# Patient Record
Sex: FEMALE | URBAN - METROPOLITAN AREA
[De-identification: names, ages, dates, MRNs, and addresses within clinical notes are randomized per-mention and may not be internally consistent; named-entity substitution may affect disease eponyms.]

---

## 2024-03-09 ENCOUNTER — ANESTHESIA (OUTPATIENT)
Dept: LABOR AND DELIVERY | Age: 34
End: 2024-03-09

## 2024-03-09 ENCOUNTER — ANESTHESIA EVENT (OUTPATIENT)
Dept: LABOR AND DELIVERY | Age: 34
End: 2024-03-09

## 2024-03-09 ENCOUNTER — HOSPITAL ENCOUNTER (INPATIENT)
Age: 34
LOS: 1 days | Discharge: HOME | DRG: 560 | End: 2024-03-10
Attending: OBSTETRICS & GYNECOLOGY | Admitting: OBSTETRICS & GYNECOLOGY
Payer: COMMERCIAL

## 2024-03-09 PROBLEM — O34.219 HISTORY OF CESAREAN DELIVERY, CURRENTLY PREGNANT: Status: ACTIVE | Noted: 2024-03-09

## 2024-03-09 PROBLEM — R10.9 ABDOMINAL PAIN IN PREGNANCY, THIRD TRIMESTER: Status: ACTIVE | Noted: 2024-03-09

## 2024-03-09 PROBLEM — Z3A.37 37 WEEKS GESTATION OF PREGNANCY: Status: ACTIVE | Noted: 2024-03-09

## 2024-03-09 PROBLEM — O26.893 ABDOMINAL PAIN IN PREGNANCY, THIRD TRIMESTER: Status: ACTIVE | Noted: 2024-03-09

## 2024-03-09 LAB
ABO + RH BLD: NORMAL
BASE DEFICIT BLD-SCNC: 4 MMOL/L
BASE DEFICIT BLD-SCNC: 5.8 MMOL/L
BLOOD GROUP ANTIBODIES SERPL: NORMAL
ERYTHROCYTE [DISTWIDTH] IN BLOOD BY AUTOMATED COUNT: 17 % (ref 11.9–14.6)
HCO3 BLD-SCNC: 20.9 MMOL/L (ref 22–26)
HCO3 BLDV-SCNC: 22.4 MMOL/L (ref 23–28)
HCT VFR BLD AUTO: 34.9 % (ref 35.8–46.3)
HGB BLD-MCNC: 11.2 G/DL (ref 11.7–15.4)
MCH RBC QN AUTO: 28.1 PG (ref 26.1–32.9)
MCHC RBC AUTO-ENTMCNC: 32.1 G/DL (ref 31.4–35)
MCV RBC AUTO: 87.7 FL (ref 82–102)
NRBC # BLD: 0 K/UL (ref 0–0.2)
PCO2 BLDCO: 44 MMHG (ref 32–68)
PCO2 BLDCO: 45 MMHG (ref 32–68)
PH BLDCO: 7.28 (ref 7.15–7.38)
PH BLDCO: 7.31 (ref 7.15–7.38)
PLATELET # BLD AUTO: 144 K/UL (ref 150–450)
PMV BLD AUTO: 10.1 FL (ref 9.4–12.3)
PO2 BLDCO: 15 MMHG
PO2 BLDCO: 19 MMHG
RBC # BLD AUTO: 3.98 M/UL (ref 4.05–5.2)
RPR SER QL: NONREACTIVE
SAO2 % BLD: 16 % (ref 95–98)
SAO2 % BLDV: 25.6 % (ref 65–88)
SERVICE CMNT-IMP: ABNORMAL
SERVICE CMNT-IMP: ABNORMAL
SPECIMEN EXP DATE BLD: NORMAL
SPECIMEN TYPE: ABNORMAL
SPECIMEN TYPE: ABNORMAL
WBC # BLD AUTO: 8.6 K/UL (ref 4.3–11.1)

## 2024-03-09 PROCEDURE — 59025 FETAL NON-STRESS TEST: CPT

## 2024-03-09 PROCEDURE — 1100000000 HC RM PRIVATE

## 2024-03-09 PROCEDURE — 2580000003 HC RX 258: Performed by: OBSTETRICS & GYNECOLOGY

## 2024-03-09 PROCEDURE — 7100000011 HC PHASE II RECOVERY - ADDTL 15 MIN

## 2024-03-09 PROCEDURE — 82803 BLOOD GASES ANY COMBINATION: CPT

## 2024-03-09 PROCEDURE — 6360000002 HC RX W HCPCS: Performed by: ANESTHESIOLOGY

## 2024-03-09 PROCEDURE — 86900 BLOOD TYPING SEROLOGIC ABO: CPT

## 2024-03-09 PROCEDURE — 99285 EMERGENCY DEPT VISIT HI MDM: CPT

## 2024-03-09 PROCEDURE — 7220000101 HC DELIVERY VAGINAL/SINGLE

## 2024-03-09 PROCEDURE — 86850 RBC ANTIBODY SCREEN: CPT

## 2024-03-09 PROCEDURE — 6370000000 HC RX 637 (ALT 250 FOR IP): Performed by: OBSTETRICS & GYNECOLOGY

## 2024-03-09 PROCEDURE — 3700000025 EPIDURAL BLOCK: Performed by: ANESTHESIOLOGY

## 2024-03-09 PROCEDURE — 85027 COMPLETE CBC AUTOMATED: CPT

## 2024-03-09 PROCEDURE — 2500000003 HC RX 250 WO HCPCS: Performed by: ANESTHESIOLOGY

## 2024-03-09 PROCEDURE — 00HU33Z INSERTION OF INFUSION DEVICE INTO SPINAL CANAL, PERCUTANEOUS APPROACH: ICD-10-PCS | Performed by: OBSTETRICS & GYNECOLOGY

## 2024-03-09 PROCEDURE — 36415 COLL VENOUS BLD VENIPUNCTURE: CPT

## 2024-03-09 PROCEDURE — 7210000100 HC LABOR FEE PER 1 HR

## 2024-03-09 PROCEDURE — 86592 SYPHILIS TEST NON-TREP QUAL: CPT

## 2024-03-09 PROCEDURE — 51701 INSERT BLADDER CATHETER: CPT

## 2024-03-09 PROCEDURE — 7100000010 HC PHASE II RECOVERY - FIRST 15 MIN

## 2024-03-09 PROCEDURE — 4A1HXCZ MONITORING OF PRODUCTS OF CONCEPTION, CARDIAC RATE, EXTERNAL APPROACH: ICD-10-PCS | Performed by: OBSTETRICS & GYNECOLOGY

## 2024-03-09 PROCEDURE — 86901 BLOOD TYPING SEROLOGIC RH(D): CPT

## 2024-03-09 PROCEDURE — 10907ZC DRAINAGE OF AMNIOTIC FLUID, THERAPEUTIC FROM PRODUCTS OF CONCEPTION, VIA NATURAL OR ARTIFICIAL OPENING: ICD-10-PCS | Performed by: OBSTETRICS & GYNECOLOGY

## 2024-03-09 PROCEDURE — 36600 WITHDRAWAL OF ARTERIAL BLOOD: CPT

## 2024-03-09 RX ORDER — LANOLIN ALCOHOL/MO/W.PET/CERES
325 CREAM (GRAM) TOPICAL
COMMUNITY

## 2024-03-09 RX ORDER — ONDANSETRON 4 MG/1
4 TABLET, ORALLY DISINTEGRATING ORAL EVERY 6 HOURS PRN
Status: DISCONTINUED | OUTPATIENT
Start: 2024-03-09 | End: 2024-03-10 | Stop reason: HOSPADM

## 2024-03-09 RX ORDER — CARBOPROST TROMETHAMINE 250 UG/ML
250 INJECTION, SOLUTION INTRAMUSCULAR PRN
Status: DISCONTINUED | OUTPATIENT
Start: 2024-03-09 | End: 2024-03-10 | Stop reason: HOSPADM

## 2024-03-09 RX ORDER — SIMETHICONE 80 MG
80 TABLET,CHEWABLE ORAL EVERY 6 HOURS PRN
Status: DISCONTINUED | OUTPATIENT
Start: 2024-03-09 | End: 2024-03-10 | Stop reason: HOSPADM

## 2024-03-09 RX ORDER — ROPIVACAINE HYDROCHLORIDE 2 MG/ML
INJECTION, SOLUTION EPIDURAL; INFILTRATION; PERINEURAL PRN
Status: DISCONTINUED | OUTPATIENT
Start: 2024-03-09 | End: 2024-03-09 | Stop reason: SDUPTHER

## 2024-03-09 RX ORDER — ACETAMINOPHEN 325 MG/1
650 TABLET ORAL EVERY 4 HOURS PRN
Status: DISCONTINUED | OUTPATIENT
Start: 2024-03-09 | End: 2024-03-09

## 2024-03-09 RX ORDER — DOCUSATE SODIUM 100 MG/1
100 CAPSULE, LIQUID FILLED ORAL 2 TIMES DAILY
Status: DISCONTINUED | OUTPATIENT
Start: 2024-03-09 | End: 2024-03-09

## 2024-03-09 RX ORDER — DIPHENHYDRAMINE HYDROCHLORIDE 50 MG/ML
25 INJECTION INTRAMUSCULAR; INTRAVENOUS EVERY 4 HOURS PRN
Status: DISCONTINUED | OUTPATIENT
Start: 2024-03-09 | End: 2024-03-10 | Stop reason: HOSPADM

## 2024-03-09 RX ORDER — IBUPROFEN 800 MG/1
800 TABLET ORAL EVERY 8 HOURS
Status: DISCONTINUED | OUTPATIENT
Start: 2024-03-09 | End: 2024-03-10 | Stop reason: HOSPADM

## 2024-03-09 RX ORDER — SODIUM CHLORIDE, SODIUM LACTATE, POTASSIUM CHLORIDE, AND CALCIUM CHLORIDE .6; .31; .03; .02 G/100ML; G/100ML; G/100ML; G/100ML
1000 INJECTION, SOLUTION INTRAVENOUS PRN
Status: DISCONTINUED | OUTPATIENT
Start: 2024-03-09 | End: 2024-03-10 | Stop reason: HOSPADM

## 2024-03-09 RX ORDER — LIDOCAINE HYDROCHLORIDE AND EPINEPHRINE 15; 5 MG/ML; UG/ML
INJECTION, SOLUTION EPIDURAL PRN
Status: DISCONTINUED | OUTPATIENT
Start: 2024-03-09 | End: 2024-03-09 | Stop reason: SDUPTHER

## 2024-03-09 RX ORDER — TRANEXAMIC ACID 10 MG/ML
1000 INJECTION, SOLUTION INTRAVENOUS
Status: ACTIVE | OUTPATIENT
Start: 2024-03-09 | End: 2024-03-10

## 2024-03-09 RX ORDER — SODIUM CHLORIDE 0.9 % (FLUSH) 0.9 %
5-40 SYRINGE (ML) INJECTION PRN
Status: DISCONTINUED | OUTPATIENT
Start: 2024-03-09 | End: 2024-03-10 | Stop reason: HOSPADM

## 2024-03-09 RX ORDER — ACETAMINOPHEN 500 MG
1000 TABLET ORAL EVERY 8 HOURS
Status: DISCONTINUED | OUTPATIENT
Start: 2024-03-09 | End: 2024-03-10 | Stop reason: HOSPADM

## 2024-03-09 RX ORDER — ONDANSETRON 2 MG/ML
4 INJECTION INTRAMUSCULAR; INTRAVENOUS EVERY 6 HOURS PRN
Status: DISCONTINUED | OUTPATIENT
Start: 2024-03-09 | End: 2024-03-09

## 2024-03-09 RX ORDER — SODIUM CHLORIDE 0.9 % (FLUSH) 0.9 %
5-40 SYRINGE (ML) INJECTION EVERY 12 HOURS SCHEDULED
Status: DISCONTINUED | OUTPATIENT
Start: 2024-03-09 | End: 2024-03-10 | Stop reason: HOSPADM

## 2024-03-09 RX ORDER — LANOLIN
CREAM (ML) TOPICAL PRN
Status: DISCONTINUED | OUTPATIENT
Start: 2024-03-09 | End: 2024-03-10 | Stop reason: HOSPADM

## 2024-03-09 RX ORDER — LIDOCAINE HYDROCHLORIDE 10 MG/ML
INJECTION, SOLUTION INFILTRATION; PERINEURAL PRN
Status: DISCONTINUED | OUTPATIENT
Start: 2024-03-09 | End: 2024-03-09 | Stop reason: SDUPTHER

## 2024-03-09 RX ORDER — ONDANSETRON 2 MG/ML
4 INJECTION INTRAMUSCULAR; INTRAVENOUS EVERY 6 HOURS PRN
Status: DISCONTINUED | OUTPATIENT
Start: 2024-03-09 | End: 2024-03-10 | Stop reason: HOSPADM

## 2024-03-09 RX ORDER — DOCUSATE SODIUM 100 MG/1
100 CAPSULE, LIQUID FILLED ORAL 2 TIMES DAILY
Status: DISCONTINUED | OUTPATIENT
Start: 2024-03-09 | End: 2024-03-10 | Stop reason: HOSPADM

## 2024-03-09 RX ORDER — OXYCODONE HYDROCHLORIDE 5 MG/1
5 TABLET ORAL EVERY 4 HOURS PRN
Status: DISCONTINUED | OUTPATIENT
Start: 2024-03-09 | End: 2024-03-10 | Stop reason: HOSPADM

## 2024-03-09 RX ORDER — FAMOTIDINE 20 MG/1
20 TABLET, FILM COATED ORAL 2 TIMES DAILY PRN
Status: DISCONTINUED | OUTPATIENT
Start: 2024-03-09 | End: 2024-03-10 | Stop reason: HOSPADM

## 2024-03-09 RX ORDER — METHYLERGONOVINE MALEATE 0.2 MG/ML
200 INJECTION INTRAVENOUS PRN
Status: DISCONTINUED | OUTPATIENT
Start: 2024-03-09 | End: 2024-03-10 | Stop reason: HOSPADM

## 2024-03-09 RX ORDER — MISOPROSTOL 200 UG/1
400 TABLET ORAL PRN
Status: DISCONTINUED | OUTPATIENT
Start: 2024-03-09 | End: 2024-03-10 | Stop reason: HOSPADM

## 2024-03-09 RX ORDER — LIDOCAINE HYDROCHLORIDE 10 MG/ML
30 INJECTION, SOLUTION INFILTRATION; PERINEURAL PRN
Status: DISCONTINUED | OUTPATIENT
Start: 2024-03-09 | End: 2024-03-10 | Stop reason: HOSPADM

## 2024-03-09 RX ORDER — SODIUM CHLORIDE 9 MG/ML
INJECTION, SOLUTION INTRAVENOUS PRN
Status: DISCONTINUED | OUTPATIENT
Start: 2024-03-09 | End: 2024-03-10 | Stop reason: HOSPADM

## 2024-03-09 RX ORDER — SODIUM CHLORIDE, SODIUM LACTATE, POTASSIUM CHLORIDE, CALCIUM CHLORIDE 600; 310; 30; 20 MG/100ML; MG/100ML; MG/100ML; MG/100ML
INJECTION, SOLUTION INTRAVENOUS CONTINUOUS
Status: DISCONTINUED | OUTPATIENT
Start: 2024-03-09 | End: 2024-03-10 | Stop reason: HOSPADM

## 2024-03-09 RX ORDER — TERBUTALINE SULFATE 1 MG/ML
0.25 INJECTION, SOLUTION SUBCUTANEOUS
Status: ACTIVE | OUTPATIENT
Start: 2024-03-09 | End: 2024-03-10

## 2024-03-09 RX ORDER — SODIUM CHLORIDE, SODIUM LACTATE, POTASSIUM CHLORIDE, AND CALCIUM CHLORIDE .6; .31; .03; .02 G/100ML; G/100ML; G/100ML; G/100ML
500 INJECTION, SOLUTION INTRAVENOUS PRN
Status: DISCONTINUED | OUTPATIENT
Start: 2024-03-09 | End: 2024-03-10 | Stop reason: HOSPADM

## 2024-03-09 RX ORDER — SODIUM CHLORIDE 9 MG/ML
25 INJECTION, SOLUTION INTRAVENOUS PRN
Status: DISCONTINUED | OUTPATIENT
Start: 2024-03-09 | End: 2024-03-09

## 2024-03-09 RX ORDER — DIPHENHYDRAMINE HCL 25 MG
25 CAPSULE ORAL EVERY 4 HOURS PRN
Status: DISCONTINUED | OUTPATIENT
Start: 2024-03-09 | End: 2024-03-10 | Stop reason: HOSPADM

## 2024-03-09 RX ADMIN — ACETAMINOPHEN 1000 MG: 500 TABLET, FILM COATED ORAL at 22:00

## 2024-03-09 RX ADMIN — LIDOCAINE HYDROCHLORIDE,EPINEPHRINE BITARTRATE 3 ML: 15; .005 INJECTION, SOLUTION EPIDURAL; INFILTRATION; INTRACAUDAL; PERINEURAL at 01:30

## 2024-03-09 RX ADMIN — ACETAMINOPHEN 1000 MG: 500 TABLET, FILM COATED ORAL at 14:11

## 2024-03-09 RX ADMIN — IBUPROFEN 800 MG: 800 TABLET, FILM COATED ORAL at 17:41

## 2024-03-09 RX ADMIN — OXYCODONE 5 MG: 5 TABLET ORAL at 17:41

## 2024-03-09 RX ADMIN — DOCUSATE SODIUM 100 MG: 100 CAPSULE, LIQUID FILLED ORAL at 21:59

## 2024-03-09 RX ADMIN — ROPIVACAINE HYDROCHLORIDE 10 ML/HR: 2 INJECTION, SOLUTION EPIDURAL; INFILTRATION; PERINEURAL at 01:34

## 2024-03-09 RX ADMIN — Medication 166.7 ML: at 07:27

## 2024-03-09 RX ADMIN — ROPIVACAINE HYDROCHLORIDE 9 ML: 2 INJECTION, SOLUTION EPIDURAL; INFILTRATION; PERINEURAL at 01:33

## 2024-03-09 RX ADMIN — SODIUM CHLORIDE, POTASSIUM CHLORIDE, SODIUM LACTATE AND CALCIUM CHLORIDE: 600; 310; 30; 20 INJECTION, SOLUTION INTRAVENOUS at 01:27

## 2024-03-09 RX ADMIN — OXYCODONE 5 MG: 5 TABLET ORAL at 14:11

## 2024-03-09 RX ADMIN — OXYCODONE 5 MG: 5 TABLET ORAL at 10:10

## 2024-03-09 RX ADMIN — LIDOCAINE HYDROCHLORIDE 2 ML: 10 INJECTION, SOLUTION INFILTRATION; PERINEURAL at 01:32

## 2024-03-09 RX ADMIN — OXYCODONE 5 MG: 5 TABLET ORAL at 21:59

## 2024-03-09 RX ADMIN — SODIUM CHLORIDE, POTASSIUM CHLORIDE, SODIUM LACTATE AND CALCIUM CHLORIDE 1000 ML: 600; 310; 30; 20 INJECTION, SOLUTION INTRAVENOUS at 01:15

## 2024-03-09 RX ADMIN — IBUPROFEN 800 MG: 800 TABLET, FILM COATED ORAL at 10:01

## 2024-03-09 ASSESSMENT — PAIN SCALES - GENERAL: PAINLEVEL_OUTOF10: 6

## 2024-03-09 ASSESSMENT — LIFESTYLE VARIABLES: SMOKING_STATUS: 1

## 2024-03-09 ASSESSMENT — PAIN DESCRIPTION - LOCATION: LOCATION: ABDOMEN;PERINEUM

## 2024-03-09 NOTE — ANESTHESIA PROCEDURE NOTES
Epidural Block    Patient location during procedure: OB  Start time: 3/9/2024 1:20 AM  End time: 3/9/2024 1:30 AM  Reason for block: labor epidural  Staffing  Performed: anesthesiologist   Anesthesiologist: Dajuan Mccurdy MD  Performed by: Dajuan Mccurdy MD  Authorized by: Dajuan Mccurdy MD    Epidural  Patient position: sitting  Prep: ChloraPrep  Patient monitoring: continuous pulse ox and frequent blood pressure checks  Approach: midline  Location: L3-4  Injection technique: AZALIA saline  Provider prep: mask and sterile gloves  Needle  Needle type: Tuohy   Needle gauge: 17 G  Needle length: 3.5 in (10 cm)  Needle insertion depth: 5.5 cm  Catheter type: end hole  Catheter size: 19 G  Catheter at skin depth: 9.5 cm  Test dose: negativeCatheter Secured: tegaderm and tape (liquid adhesive)  Assessment  Hemodynamics: stable  Attempts: 1  Outcomes: patient tolerated procedure well and uncomplicated  Additional Notes  3 cc 1% lidocaine local at needle insertion site.      Discussed the risks and benefits of epidural placement and use with patient including (but not limited to) the risk of wet tap and spinal headache, inadequate analgesia, need for removal/replacement of epidural, and hypotension. I discussed the remote risk of uncontrolled bleeding or infection requiring an operation to remedy.  After the patient agreed to proceed, I ensured the patient had no contraindications to labor epidural placement including prohibitive heart defects, hypocoagulation, family or personal history of a bleeding disorder.  Epidural placement is described in the block note.  Frequent hemodynamic monitoring in the first 30 minutes following initial placement was performed.  The patient and OB RN were instructed to call anytime with any questions or concerns at anytime.     Preanesthetic Checklist  Completed: patient identified, IV checked, risks and benefits discussed, equipment checked, pre-op evaluation, timeout performed,

## 2024-03-09 NOTE — ANESTHESIA PRE PROCEDURE
methylergonovine (METHERGINE) injection 200 mcg  200 mcg IntraMUSCular PRN Ashley Abebe MD       • carboprost (HEMABATE) injection 250 mcg  250 mcg IntraMUSCular PRN Ashley Abebe MD       • miSOPROStol (CYTOTEC) tablet 400 mcg  400 mcg Buccal PRN Ashley Abebe MD       • tranexamic acid-NaCl IVPB premix 1,000 mg  1,000 mg IntraVENous Once PRN Ashley Abebe MD       • acetaminophen (TYLENOL) tablet 650 mg  650 mg Oral Q4H PRN Ashley Abebe MD       • lidocaine 1 % injection 30 mL  30 mL Other PRN Ashley Abebe MD       • butorphanol (STADOL) injection 1 mg  1 mg IntraVENous Q3H PRN Ashley Abebe MD       • simethicone (MYLICON) chewable tablet 80 mg  80 mg Oral Q6H PRN Ashley Abebe MD       • docusate sodium (COLACE) capsule 100 mg  100 mg Oral BID Ashley Abebe MD       • magnesium hydroxide (MILK OF MAGNESIA) 400 MG/5ML suspension 30 mL  30 mL Oral Daily PRN Ashley Abebe MD       • diphenhydrAMINE (BENADRYL) capsule 25 mg  25 mg Oral Q4H PRN Ashley Abebe MD        Or   • diphenhydrAMINE (BENADRYL) injection 25 mg  25 mg IntraVENous Q4H PRN Ashley Abebe MD       • witch hazel-glycerin (TUCKS) pad   Topical PRN Ashley Abebe MD           Allergies:  No Known Allergies    Problem List:    Patient Active Problem List   Diagnosis Code   • Abdominal pain in pregnancy, third trimester O26.893, R10.9   • 37 weeks gestation of pregnancy Z3A.37   • History of  delivery, currently pregnant O34.219       Past Medical History:  No past medical history on file.    Past Surgical History:        Procedure Laterality Date   •  SECTION         Social History:    Social History     Tobacco Use   • Smoking status: Every Day     Types: Cigarettes     Passive exposure: Current   • Smokeless tobacco: Never   • Tobacco comments:     Smokes 5 cig/day   Substance Use Topics   • Alcohol use: Not Currently                                Ready to quit: No  Counseling given: Yes  Tobacco

## 2024-03-09 NOTE — ANESTHESIA POSTPROCEDURE EVALUATION
Department of Anesthesiology  Postprocedure Note    Patient: Carmen Rizo  MRN: [id number]  YOB: 1990  Date of evaluation: 3/9/2024    Procedure Summary       Date: 03/09/24 Room / Location:     Anesthesia Start: 0116 Anesthesia Stop: 0719    Procedure: Labor Analgesia Diagnosis:     Scheduled Providers:  Responsible Provider: Dajuan Mccurdy MD    Anesthesia Type: epidural ASA Status: 2            Anesthesia Type: No value filed.    Madyson Phase I:      Madyson Phase II:      Anesthesia Post Evaluation    Patient location during evaluation: PACU  Patient participation: complete - patient participated  Level of consciousness: awake and alert  Airway patency: patent  Nausea & Vomiting: no nausea and no vomiting  Cardiovascular status: hemodynamically stable  Respiratory status: acceptable, nonlabored ventilation and spontaneous ventilation  Hydration status: euvolemic  Comments: /78   Pulse (!) 109   Temp 98.2 °F (36.8 °C) (Oral)   Resp 16   LMP 06/14/2023   SpO2 100%   Breastfeeding Unknown     Multimodal analgesia pain management approach  Pain management: adequate and satisfactory to patient        No notable events documented.

## 2024-03-09 NOTE — L&D DELIVERY NOTE
Delivery Note    Obstetrician:  Brielle Young MD    Pre-Delivery Diagnosis: Term pregnancy, Spontaneous labor, History of C/S, Smokes tobacco,History recurrent pregnancy loss, and Rh negative    Post-Delivery Diagnosis: Same plus live born FEMALE infant ([place])    Procedure: Successful     Epidural: Yes    Monitor:  Fetal Heart Tones - External    Estimated Blood Loss: No data found    Episiotomy: none    Laceration(s):  none    Laceration(s) repair: N/A    Presentation: Cephalic    Fetal Description: Live born, vigorous female infant    Fetal Position: Left Occiput Anterior    Birth Weight: 6lb 2oz    Apgar - 8  and 9 .    Umbilical Cord: 3 vessels present  Specimens: Cord blood and gases  Complications:  None    Delivery note:  Patient progressed to complete/complete/+2 and pushed with excellent effort to deliver a female infant, position VLADIMIR. Head was delivered in a controlled manner followed by shoulders and body with usual maneuvers. The infant was placed on mother's abdomen for skin to skin contact. Cord was clamped and cut after a 45 second delay. Pediatric team was present for meconium and Cat 2 tracing prior to delivery. The placenta delivered spontaneously and intact, described as above. Uterus swept and  scar palpated intact. Uterus was firm, lochia appropriate. There were no lacerations. Mom and baby doing well.             Cord Blood Results:   Information for the patient's :  Moulton, Edwin Morales [[id number]]   No results found for: \"PCTDIG\", \"BILI\"   Prenatal Labs:   No components found for: \"PCTABR\", \"OBEXTABSCRN\", \"OBEXTHBSAG\", \"OBEXTHIV\", \"OBEXTRUBELLA\", \"OBEXTRPR\", \"OBEXTGONORR\", \"OBEXTCHLAM\", \"OBEXTGRBS\"     Brielle Young MD        MoultonEdwin [[id number]]      Labor Events     Labor: No   Steroids: None  Cervical Ripening Date/Time:      Antibiotics Received during Labor: No  Rupture Identifier: Sac 1  Rupture Date/Time:  3/9/24 06:39:00   Rupture Type:

## 2024-03-09 NOTE — H&P
HISTORY AND PHYSICAL             Date: 3/9/2024        Patient Name: Carmen Rizo     YOB: 1990      Age:  33 y.o.    Chief Complaint     Chief Complaint   Patient presents with    Contractions    Abdominal Pain           History Obtained From   patient    History of Present Illness   patient is a 30 yo  with ELEAZAR 3/28/2024 at  37 weeks 2 days by US dating (lmp was 2023) who presents with contractions    Onset: 10 am  Quality:  painful getting stronger & more frequent every 3 minutes  Severity:  10/10  Associated: she reports good fetal movement, no VB or change in discharge, nausea & vomiting, no HA or visual changes; reports last office exam 3/7/2024 was ftp/80% posterior    Prenatal Care: [facility] - limited prenatal records visible in [facility], pregnancy complicated by prior c/s (G1 , G2 C/S); IUGR 2 prior full term pregnancies - reports serial US growth normal this pregnancy, Anemia - on iron supplement daily last Hgb 8.6, Blood O neg - rec'd Rhogam; reports GBS was negative  Prenatal records reflect discussion regarding R/B/A of  vs C/S and patient desires TOLAC    Past Medical History   No past medical history on file.     Past Surgical History     Past Surgical History:   Procedure Laterality Date     SECTION          Medications Prior to Admission     Prior to Admission medications    Medication Sig Start Date End Date Taking? Authorizing Provider   Prenatal MV-Min-Fe Fum-FA-DHA (PRENATAL 1 PO) Take by mouth   Yes ProviderBrooks MD   ferrous sulfate (FE TABS 325) 325 (65 Fe) MG EC tablet Take 1 tablet by mouth daily (with breakfast)   Yes ProviderBrooks MD        Allergies   Patient has no known allergies.    Social History     Social History       Tobacco History       Smoking Status  Never Assessed      Smokeless Tobacco Use  Unknown              Alcohol History       Alcohol Use Status  Not Asked              Drug Use       Drug

## 2024-03-10 ENCOUNTER — LACTATION ENCOUNTER (OUTPATIENT)
Dept: MOTHER INFANT UNIT | Age: 34
End: 2024-03-10

## 2024-03-10 VITALS
TEMPERATURE: 97.5 F | OXYGEN SATURATION: 99 % | HEART RATE: 73 BPM | RESPIRATION RATE: 16 BRPM | SYSTOLIC BLOOD PRESSURE: 113 MMHG | DIASTOLIC BLOOD PRESSURE: 79 MMHG

## 2024-03-10 PROBLEM — O34.219 HISTORY OF CESAREAN DELIVERY, CURRENTLY PREGNANT: Status: RESOLVED | Noted: 2024-03-09 | Resolved: 2024-03-10

## 2024-03-10 PROBLEM — O26.893 ABDOMINAL PAIN IN PREGNANCY, THIRD TRIMESTER: Status: RESOLVED | Noted: 2024-03-09 | Resolved: 2024-03-10

## 2024-03-10 PROBLEM — R10.9 ABDOMINAL PAIN IN PREGNANCY, THIRD TRIMESTER: Status: RESOLVED | Noted: 2024-03-09 | Resolved: 2024-03-10

## 2024-03-10 PROBLEM — Z3A.37 37 WEEKS GESTATION OF PREGNANCY: Status: RESOLVED | Noted: 2024-03-09 | Resolved: 2024-03-10

## 2024-03-10 LAB
BLOOD BANK CMNT PATIENT-IMP: NORMAL
FETAL SCREEN: NORMAL
HGB BLD-MCNC: 9.7 G/DL (ref 11.7–15.4)

## 2024-03-10 PROCEDURE — 6370000000 HC RX 637 (ALT 250 FOR IP): Performed by: OBSTETRICS & GYNECOLOGY

## 2024-03-10 PROCEDURE — 36415 COLL VENOUS BLD VENIPUNCTURE: CPT

## 2024-03-10 PROCEDURE — 85018 HEMOGLOBIN: CPT

## 2024-03-10 PROCEDURE — 6360000002 HC RX W HCPCS: Performed by: OBSTETRICS & GYNECOLOGY

## 2024-03-10 PROCEDURE — 85461 HEMOGLOBIN FETAL: CPT

## 2024-03-10 PROCEDURE — 96372 THER/PROPH/DIAG INJ SC/IM: CPT

## 2024-03-10 RX ORDER — IBUPROFEN 800 MG/1
800 TABLET ORAL EVERY 6 HOURS PRN
Qty: 30 TABLET | Refills: 0 | Status: SHIPPED | OUTPATIENT
Start: 2024-03-10 | End: 2024-03-24

## 2024-03-10 RX ORDER — FERROUS SULFATE 325(65) MG
325 TABLET ORAL
Status: DISCONTINUED | OUTPATIENT
Start: 2024-03-10 | End: 2024-03-10 | Stop reason: HOSPADM

## 2024-03-10 RX ADMIN — DOCUSATE SODIUM 100 MG: 100 CAPSULE, LIQUID FILLED ORAL at 09:24

## 2024-03-10 RX ADMIN — IBUPROFEN 800 MG: 800 TABLET, FILM COATED ORAL at 03:55

## 2024-03-10 RX ADMIN — ACETAMINOPHEN 1000 MG: 500 TABLET, FILM COATED ORAL at 09:24

## 2024-03-10 RX ADMIN — IBUPROFEN 800 MG: 800 TABLET, FILM COATED ORAL at 13:11

## 2024-03-10 RX ADMIN — RHO(D) IMMUNE GLOBULIN (HUMAN) 300 MCG: 1500 SOLUTION INTRAMUSCULAR at 09:27

## 2024-03-10 RX ADMIN — FERROUS SULFATE TAB 325 MG (65 MG ELEMENTAL FE) 325 MG: 325 (65 FE) TAB at 09:32

## 2024-03-10 ASSESSMENT — PAIN DESCRIPTION - LOCATION: LOCATION: ABDOMEN;PERINEUM

## 2024-03-10 ASSESSMENT — PAIN SCALES - GENERAL: PAINLEVEL_OUTOF10: 6

## 2024-03-10 NOTE — DISCHARGE SUMMARY
Obstetrical Discharge Summary     Name: Carmen Rizo MRN: [id number]  SSN: [id number]    YOB: 1990  Age: 33 y.o.  Sex: female      Allergies: Patient has no known allergies.    Admit Date: 3/9/2024    Discharge Date: 3/10/2024     Admitting Physician: Brielle Young MD     Attending Physician:  Brielle Young MD     * Admission Diagnoses: Abdominal pain in pregnancy, third trimester [O26.893, R10.9]    * Discharge Diagnoses:   Information for the patient's :  Astatula, Girl Carmen [[id number]]   @[id number]@      Additional Diagnoses:    Lab Results   Component Value Date/Time    ABORH O NEGATIVE 2024 01:11 AM    There is no immunization history for the selected administration types on file for this patient.    * Procedures: Successful            * Discharge Condition: Good    * Hospital Course: Normal hospital course following the delivery. Acute blood loss anemia worsened chronic iron deficiency anemia. No symptoms. Continue oral iron. Lochia appropriate.     * Disposition: Home    Discharge Medications:      Medication List        START taking these medications      ibuprofen 800 MG tablet  Commonly known as: ADVIL;MOTRIN  Take 1 tablet by mouth every 6 hours as needed for Pain            CONTINUE taking these medications      ferrous sulfate 325 (65 Fe) MG EC tablet  Commonly known as: FE TABS 325     PRENATAL 1 PO               Where to Get Your Medications        These medications were sent to [facility] [address] [place] [address] - P [phone] - F [phone]  [address] [place] [address]      Phone: [phone]   ibuprofen 800 MG tablet         * Follow-up Care/Patient Instructions:  Activity: activity as tolerated  Diet: regular diet  Wound Care: none needed    F/U with Dr. Young in 6 weeks    Brielle Young MD

## 2024-03-10 NOTE — DISCHARGE INSTRUCTIONS
Vaginal Childbirth: Care Instructions  Overview     Vaginal birth means delivering a baby through the birth canal (vagina). During labor, the uterus tightens (contracts) regularly to thin and open the cervix and to push the baby out through the birth canal.  Your body will slowly heal in the next few weeks. It's easy to get too tired and overwhelmed during the first weeks after your baby is born. Changes in your hormones can shift your mood without warning. You may find it hard to meet the extra demands on your energy and time. Take it easy on yourself.  Follow-up care is a key part of your treatment and safety. Be sure to make and go to all appointments, and call your doctor if you are having problems. It's also a good idea to know your test results and keep a list of the medicines you take.  How can you care for yourself at home?  Vaginal bleeding and cramps  After delivery, you will have a bloody discharge from your vagina. This will turn pink within a week and then white or yellow after about 10 days. It may last for 2 to 4 weeks or longer, until the uterus has healed. Use sanitary pads until you stop bleeding. Using pads makes it easier to monitor your bleeding.  Don't worry if you pass some blood clots, as long as they are smaller than a golf ball. If you have a tear or stitches in your vaginal area, change the pad at least every 4 hours. This will help prevent soreness and infection.  You may have cramps for the first few days after childbirth. These are normal and occur as the uterus shrinks to normal size. Take an over-the-counter pain medicine, such as acetaminophen (Tylenol), ibuprofen (Advil, Motrin), or naproxen (Aleve), for cramps. Read and follow all instructions on the label. Do not take aspirin, because it can cause more bleeding.  Do not take two or more pain medicines at the same time unless the doctor told you to. Many pain medicines have acetaminophen, which is Tylenol. Too much acetaminophen

## 2024-03-10 NOTE — PROGRESS NOTES
03/09/24 0715   Cervical Exam   Dilation (cm) 10   Effacement 100   Station +2   Station (Labor Curve Graph) 3   OB Examiner DESIREE RN   Membrane/Amniotic Fluid   Amniotic Fluid Color (!) Clear;Pink;Meconium   Amniotic Fluid Amount Scant     FHR with deeper and wider variable and late decels.  On left side.  Repositioned to back.  SVE now.  Call light to ask for table set and to alert Dr Young of complete and plus 2 station.  
   03/09/24 0938   Urine Assessment   Urinary Status Catheterization sterile   Urinary Incontinence Present   Urine Color Yellow/straw   Urine Appearance Clear   Urine Odor No odor   Intermittent/Straight Cath (mL) 1000 mL   $ Cath urethra straight $ Yes     Bladder emptied and sade care done.  Fresh ice pack pad and panties applied.  Clean gown.  
 viable female infant at 0719.  Apgar 8/9.  VSS.  SCN attending the delivery for meconium fluid.  Infant was vigorous and crying.  
EPIDURAL PLACEMENT      Dr Mccurdy at bedside at 0118.      Assisted pt to sitting up on bedside at 0120.    Timeout completed at 0120 with MD, CRNA and myself at bedside.    Test dose given at 0129.  Negative reaction.    Dose given at 0135.    Pt assisted to lying back in left tilt position.    See anesthesia record for details.  See vital sign flow sheet for BP.    Tolerated procedure well.   
Patient discharged to home per MD orders.  Discharge instructions reviewed with patient. Questions encouraged and answered. Patient verbalizes understanding. Patient escorted by [facility] staff to private vehicle. Stable at discharge.    
Patient presents to [facility] with complaints of contractions since 10 am, getting more intense this evening. Denies VB, LOF, and DFM. No prenatal records on patient at this time in the computer. Patient states that she is a patient of [facility] and that she wishes to TOLAC. Has a 14 and 8 year old at home.Due date 3/28/24 per patient. States that her GBS was negative in the office. US and toco placed on soft, non-tender abdomen at this time.   
Patient up to bathroom with minimal assistance.  Ruchi-care taught and completed. Questions encouraged and answered.  Patient ambulating without difficulty, encouraged to call for needs or concerns. Verbalizes understanding.    
Postpartum Progress Note (VD)    Patient: Carmen Rizo MRN: [id number]  SSN: [id number]    YOB: 1990  Age: 33 y.o.  Sex: female      Subjective:     Postpartum Day: 1     Delivery: Successful vaginal delivery after previous C/S    The patient feels well. The patient denies emotional concerns. Pain is well controlled with current medications.  Voiding spontaneous. The patient is ambulating well. The patient is tolerating a normal diet.Lochia is light.    The baby is well. Baby is feeding via bottle.    Objective:      No data found.  General:    alert, cooperative, no distress   Bowel Sounds:  active   Lochia:  appropriate   Uterine Fundus:   firm   Fundus Location:  -1   Perineum:   Not examined, no lacerations   DVT Evaluation:  No evidence of DVT seen on physical exam.     Lab/Data Review:  ppHgb 9.7    Assessment:     Status post: Doing well postpartum     Plan:     - Postpartum care discussed including diet, ambulation, and actvitiy restrictions.  - Acute blood loss anemia on top of iron deficiency anemia: PO iron. NO symptoms. Lochia apporopriate.   - Discharge planning for PPD #1-2, likely today unless baby unable to be discharged  - Discharge instructions and questions answered for vaginal delivery.    Brielle Young MD      
Provider Testing:  Nonstress test    Indications:  37.2 weeks,  labor/VTOLAC    NST results::  Reactive    EFM:  baseline 140, accelerations present,  decelerations absent, moderate variability  Tocos:  q 2-4 minutes, lasting   60-90 seconds, moderate to strong    Start: 00:22 am  End:  00:54 am    Category:  1    Reviewed and interpreted by myself    Ashley Abebe MD  OB Hospitalist  
Safety Teaching reviewed:   Hand hygiene prior to handling the infant.  Use of bulb syringe  Bracelets with matching numbers are placed on mother and infant  An infant security tag  (Hugs) is placed on the infant's ankle and monitored  All OB nurses wear pink Employee badges - do not give your baby to anyone without proper identification.   Never leave the baby alone in the room.  The infant should be placed on their back to sleep.on a firm mattress. No toys should be placed in the crib. (safe sleep video offered to view)  Never shake the baby (video offered to view)  Infant fall prevention - do not sleep with the baby, and place the baby in the crib while ambulating.   Mother and Baby Care booklet given to Mother.   
Set up for delivery.  
Shift assessment complete as noted. Patient denies needs. Questions encouraged and answered. Encouraged to call for needs or concerns. Verbalizes understanding.    
Unable to fill  out patient pharmacy because she doesn't use just one and prefers paper scripts to turn into different pharmacies.   
  03/09/24 0129 -- 95 -- 139/83 --   03/09/24 0029 98.1 °F (36.7 °C) 86 16 119/70 95 %      Physical Exam:  Patient without distress.  Respirations even, nonlabored  Abdomen/Fundus: Gravid, relaxation between contraction, soft and non tender  Lower Extremities:  - Edema No  Cervical Exam: 6-7\90/-1  Membranes:  Artificial Rupture of Membranes; Amniotic Fluid: medium amount of particulate meconium fluid    Fetal Heart Rate: Cat 2 - late decles, moderate variability, accels present  TOCO: Q2          A/P:    Continue labor, currently 6-7 cm dilated. Now s/p AROM. FHR had prolonged decel after AROM. FHR recovered and giving bolus now. Still with late decels. Will continue resuscitations efforts. If FHR does not improve and labor not quickly progressing safest delivery may be by  C/S.     Brielle Young MD

## 2025-03-07 ENCOUNTER — HOSPITAL ENCOUNTER (EMERGENCY)
Age: 35
Discharge: HOME | End: 2025-03-07
Payer: COMMERCIAL

## 2025-03-07 ENCOUNTER — APPOINTMENT (OUTPATIENT)
Dept: GENERAL RADIOLOGY | Age: 35
End: 2025-03-07
Payer: COMMERCIAL

## 2025-03-07 VITALS
SYSTOLIC BLOOD PRESSURE: 104 MMHG | TEMPERATURE: 98.2 F | RESPIRATION RATE: 18 BRPM | OXYGEN SATURATION: 95 % | DIASTOLIC BLOOD PRESSURE: 74 MMHG | HEART RATE: 88 BPM

## 2025-03-07 DIAGNOSIS — M79.602 LEFT ARM PAIN: Primary | ICD-10-CM

## 2025-03-07 PROCEDURE — 73090 X-RAY EXAM OF FOREARM: CPT

## 2025-03-07 PROCEDURE — 6370000000 HC RX 637 (ALT 250 FOR IP)

## 2025-03-07 PROCEDURE — 99283 EMERGENCY DEPT VISIT LOW MDM: CPT

## 2025-03-07 RX ORDER — HYDROCODONE BITARTRATE AND ACETAMINOPHEN 5; 325 MG/1; MG/1
1 TABLET ORAL
Status: COMPLETED | OUTPATIENT
Start: 2025-03-07 | End: 2025-03-07

## 2025-03-07 RX ADMIN — HYDROCODONE BITARTRATE AND ACETAMINOPHEN 1 TABLET: 5; 325 TABLET ORAL at 13:14

## 2025-03-07 ASSESSMENT — PAIN DESCRIPTION - ORIENTATION: ORIENTATION: LEFT

## 2025-03-07 ASSESSMENT — PAIN SCALES - GENERAL: PAINLEVEL_OUTOF10: 10

## 2025-03-07 ASSESSMENT — PAIN DESCRIPTION - PAIN TYPE: TYPE: ACUTE PAIN

## 2025-03-07 ASSESSMENT — PAIN - FUNCTIONAL ASSESSMENT: PAIN_FUNCTIONAL_ASSESSMENT: 0-10

## 2025-03-07 NOTE — ED NOTES
Patient mobility status  with no difficulty.     I have reviewed discharge instructions with the patient.  The patient verbalized understanding.    Patient left ED via Discharge Method: ambulatory to Home with  friends .    Opportunity for questions and clarification provided.     Patient given 0 scripts.          Florencia Payan RN  03/07/25 [id number]

## 2025-03-07 NOTE — DISCHARGE INSTRUCTIONS
Your xray was negative for any broken bones today. Recommend rest, ice a few times a day for 10-15 minutes at a time, some support with a splint and to elevate your wrist whenever you are sitting or sleeping. Alternate tylenol and advil for pain. Follow up with your primary care provider if your pain does not resolve.    We would love to help you get a primary care doctor for follow-up after your emergency department visit.    Please call [phone] between 7AM - 6PM Monday to Friday.  A care navigator will be able to assist you with setting up a doctor close to your home.

## 2025-03-07 NOTE — ED TRIAGE NOTES
Ambulatory to triage.   Fell last night/early this AM when playing with children. Fell onto left arm/wrist. Reports left arm/wrist pain with no relief with OTC pain medications. Swelling noted during triage.

## 2025-03-07 NOTE — ED PROVIDER NOTES
Emergency Department Provider Note       PCP: Maral, Pcp   Age: 34 y.o.   Sex: female     DISPOSITION Discharge - Pending Orders Complete 03/07/2025 01:44:42 PM    ICD-10-CM    1. Left arm pain  M79.602           Medical Decision Making     Patient presents with left wrist pain and swelling.  Patient is well-appearing, ambulatory with steady gait.  Vital signs are stable.  Afebrile, no tachycardia or tachypnea.  Exam showed range of motion intact decreased to left wrist secondary to pain.  No erythema, warmth, or deformity present. Mild swelling is present. Pulses intact.  X-ray showed no acute fracture or dislocation.  I have low suspicion for significant ligamentous injury, septic arthritis, or gout flare.  Will treat conservatively with anti-inflammatory medication, ice, splint and PCP follow-up.  Return precautions discussed.  Patient verbalized understanding is agreeable for discharge home.       1 or more acute illnesses that pose a threat to life or bodily function.   Prescription drug management performed.  Patient was discharged risks and benefits of hospitalization were considered.  Shared medical decision making was utilized in creating the patients health plan today.  I independently ordered and reviewed each unique test.           I interpreted the X-rays no acute fracture or dislocation.              History     Patient is a 34-year-old female with no segment past medical history who presents today to the ER with chief complaint of arm pain.  Patient states she was messing around with her kids last night and fell over into a small chair.  Reports pain and swelling to her left wrist.  Has been taking Tylenol and ibuprofen for pain at home.  Patient is right-handed.  Is able to move her fingers.    The history is provided by the patient.     Physical Exam     Vitals signs and nursing note reviewed:  Vitals:    03/07/25 1111   BP: 103/68   Pulse: 89   Resp: 16   Temp: 98.2 °F (36.8 °C)   TempSrc: Oral  elbow are also unremarkable.      Impression    No acute finding. If clinical concern remains for an occult fracture  consider immobilization and repeat radiographs in 7 to 10 days    Electronically signed by David Andrea         XR RADIUS ULNA LEFT (2 VIEWS)   Final Result   No acute finding. If clinical concern remains for an occult fracture   consider immobilization and repeat radiographs in 7 to 10 days      Electronically signed by David Andrea                   No results for input(s): \"COVID19\" in the last 72 hours.     Voice dictation software was used during the making of this note.  This software is not perfect and grammatical and other typographical errors may be present.  This note has not been completely proofread for errors.     Leann Wade PA-C  03/07/25 [id number]